# Patient Record
Sex: FEMALE | Race: WHITE
[De-identification: names, ages, dates, MRNs, and addresses within clinical notes are randomized per-mention and may not be internally consistent; named-entity substitution may affect disease eponyms.]

---
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
## 2020-02-14 ENCOUNTER — HOSPITAL ENCOUNTER (EMERGENCY)
Dept: HOSPITAL 41 - JD.ED | Age: 20
Discharge: HOME | End: 2020-02-14
Payer: MEDICAID

## 2020-02-14 VITALS — DIASTOLIC BLOOD PRESSURE: 74 MMHG | SYSTOLIC BLOOD PRESSURE: 112 MMHG | HEART RATE: 78 BPM

## 2020-02-14 DIAGNOSIS — Z3A.21: ICD-10-CM

## 2020-02-14 DIAGNOSIS — D25.9: ICD-10-CM

## 2020-02-14 DIAGNOSIS — O34.12: Primary | ICD-10-CM

## 2020-02-14 DIAGNOSIS — Z88.0: ICD-10-CM

## 2020-02-14 DIAGNOSIS — Z91.011: ICD-10-CM

## 2020-02-14 PROCEDURE — 81003 URINALYSIS AUTO W/O SCOPE: CPT

## 2020-02-14 PROCEDURE — 96360 HYDRATION IV INFUSION INIT: CPT

## 2020-02-14 PROCEDURE — 76815 OB US LIMITED FETUS(S): CPT

## 2020-02-14 PROCEDURE — 85025 COMPLETE CBC W/AUTO DIFF WBC: CPT

## 2020-02-14 PROCEDURE — 99284 EMERGENCY DEPT VISIT MOD MDM: CPT

## 2020-02-14 PROCEDURE — 83690 ASSAY OF LIPASE: CPT

## 2020-02-14 PROCEDURE — 36415 COLL VENOUS BLD VENIPUNCTURE: CPT

## 2020-02-14 PROCEDURE — 80053 COMPREHEN METABOLIC PANEL: CPT

## 2020-02-14 NOTE — EDM.PDOC
ED HPI GENERAL MEDICAL PROBLEM





- General


Chief Complaint: Abdominal Pain


Stated Complaint: ABDOMINAL PAIN 21 WEEKS PREG CLEARED BY OB


Time Seen by Provider: 02/14/20 21:03


Source of Information: Reports: Patient


History Limitations: Reports: No Limitations





- History of Present Illness


INITIAL COMMENTS - FREE TEXT/NARRATIVE: 





The patient presents from OB for LLQ abdominal pain.  This started about 3 

hours ago.  She has no nausea or vomiting.  She has no fever, chills, cough, 

congestion, runny nose, chest pain, shortness of breath, dysuria, hematuria or 

diarrhea.  She is G1 and 21 weeks gestation with a LNMP of 9/16/19.  She went 

to OB and they cleared her.  The baby has a FHT of 146.  She has no vaginal 

discharge or bleeding.  She has had no trouble with bowel movements.


Onset: Gradual


Duration: Hour(s):


Location: Reports: Abdomen


Quality: Reports: Sharp


Severity: Moderate


Improves with: Reports: None


Worsens with: Reports: None


Associated Symptoms: Denies: Chest Pain, Cough, Fever/Chills, Headaches, Nausea/

Vomiting, Shortness of Breath


  ** Left Lower Abdomen


Pain Score (Numeric/FACES): 6





- Related Data


 Allergies











Allergy/AdvReac Type Severity Reaction Status Date / Time


 


Dairy Products Allergy  Abdominal Verified 02/14/20 21:03





   Cramps  


 


Penicillins Allergy  Rash Verified 02/14/20 21:03











Home Meds: 


 Home Meds





Prenatal Vits #93/Iron Fum/FA [Prenatal Formula Tablet] 1 tab PO DAILY 02/14/20 

[History]











Past Medical History


Gastrointestinal History: Reports: GERD, Other (See Below)


Other Gastrointestinal History: Lactose intolerant, unexplained stomach issues 

for years


Psychiatric History: Reports: Anxiety, Depression





- Past Surgical History


HEENT Surgical History: Reports: Tonsillectomy


GI Surgical History: Reports: EGD





Social & Family History





- Family History


Family Medical History: Noncontributory





- Tobacco Use


Smoking Status *Q: Never Smoker





- Caffeine Use


Caffeine Use: Reports: Soda





- Recreational Drug Use


Recreational Drug Use: No





ED ROS GENERAL





- Review of Systems


Review Of Systems: See Below


Constitutional: Reports: No Symptoms


HEENT: Reports: No Symptoms


Respiratory: Reports: No Symptoms


Cardiovascular: Reports: No Symptoms


Endocrine: Reports: No Symptoms


GI/Abdominal: Reports: Abdominal Pain.  Denies: Diarrhea, Nausea, Vomiting


: Reports: No Symptoms


Musculoskeletal: Reports: No Symptoms


Skin: Reports: No Symptoms





ED EXAM, GI/ABD





- Physical Exam


Exam: See Below


Exam Limited By: No Limitations


General Appearance: Alert, No Apparent Distress


Ears: Normal External Exam


Nose: Normal Inspection


Head: Atraumatic, Normocephalic


Neck: Normal Inspection


Respiratory/Chest: No Respiratory Distress, Lungs Clear, Normal Breath Sounds


Cardiovascular: Regular Rate, Rhythm, No Edema, No Murmur


GI/Abdominal Exam: Soft, Tender (Mild tenderness to the left lower abdomen), 

Other (Gravid uterus above the umbilicus)





Course





- Vital Signs


Last Recorded V/S: 


 Last Vital Signs











Temp  97.1 F   02/14/20 21:04


 


Pulse  78   02/14/20 21:04


 


Resp  13   02/14/20 21:04


 


BP  112/74   02/14/20 21:04


 


Pulse Ox  100   02/14/20 21:04














- Orders/Labs/Meds


Orders: 


 Active Orders 24 hr











 Category Date Time Status


 


 Peripheral IV Care [RC] .AS DIRECTED Care  02/14/20 21:22 Active


 


 OB Ltd 1 or More Fetus [US] Stat Exams  02/14/20 21:22 Taken


 


 Sodium Chloride 0.9% [Saline Flush] Med  02/14/20 21:22 Active





 10 ml FLUSH ASDIRECTED PRN   


 


 Peripheral IV Insertion Adult [OM.PC] Stat Oth  02/14/20 21:22 Ordered








 Medication Orders





Sodium Chloride (Saline Flush)  10 ml FLUSH ASDIRECTED PRN


   PRN Reason: Keep Vein Open


   Last Admin: 02/14/20 21:59  Dose: 10 ml








Labs: 


 Laboratory Tests











  02/14/20 02/14/20 02/14/20 Range/Units





  21:56 21:56 21:56 


 


WBC  9.81    (3.98-10.04)  K/mm3


 


RBC  4.23    (3.98-5.22)  M/mm3


 


Hgb  12.5    (11.2-15.7)  gm/dl


 


Hct  37.4    (34.1-44.9)  %


 


MCV  88.4  D    (79.4-94.8)  fl


 


MCH  29.6    (25.6-32.2)  pg


 


MCHC  33.4    (32.2-35.5)  g/dl


 


RDW Std Deviation  37.3    (36.4-46.3)  fL


 


Plt Count  270    (182-369)  K/mm3


 


MPV  10.7    (9.4-12.3)  fl


 


Neut % (Auto)  79.5 H    (34.0-71.1)  %


 


Lymph % (Auto)  14.9 L    (19.3-51.7)  %


 


Mono % (Auto)  4.0 L    (4.7-12.5)  %


 


Eos % (Auto)  1.3    (0.7-5.8)  


 


Baso % (Auto)  0.2    (0.1-1.2)  %


 


Neut # (Auto)  7.80 H    (1.56-6.13)  K/mm3


 


Lymph # (Auto)  1.46    (1.18-3.74)  K/mm3


 


Mono # (Auto)  0.39 H    (0.24-0.36)  K/mm3


 


Eos # (Auto)  0.13    (0.04-0.36)  K/mm3


 


Baso # (Auto)  0.02    (0.01-0.08)  K/mm3


 


Sodium   136   (136-145)  mEq/L


 


Potassium   3.6   (3.5-5.1)  mEq/L


 


Chloride   100   ()  mEq/L


 


Carbon Dioxide   23   (21-32)  mEq/L


 


Anion Gap   16.6 H   (5-15)  


 


BUN   9   (7-18)  mg/dL


 


Creatinine   0.6   (0.55-1.02)  mg/dL


 


Est Cr Clr Drug Dosing   135.70   mL/min


 


Estimated GFR (MDRD)   > 60   (>60)  mL/min


 


BUN/Creatinine Ratio   15.0   (14-18)  


 


Glucose   100   ()  mg/dL


 


Calcium   8.7   (8.5-10.1)  mg/dL


 


Total Bilirubin   0.5   (0.2-1.0)  mg/dL


 


AST   17   (15-37)  U/L


 


ALT   27   (14-59)  U/L


 


Alkaline Phosphatase   68   ()  U/L


 


Total Protein   7.4   (6.4-8.2)  g/dl


 


Albumin   3.6   (3.4-5.0)  g/dl


 


Globulin   3.8   gm/dL


 


Albumin/Globulin Ratio   1.0   (1-2)  


 


Lipase   117   ()  U/L


 


Urine Color    Light yellow  (Yellow)  


 


Urine Appearance    Clear  (Clear)  


 


Urine pH    6.0  (5.0-8.0)  


 


Ur Specific Gravity    1.020  (1.005-1.030)  


 


Urine Protein    Negative  (Negative)  


 


Urine Glucose (UA)    Negative  (Negative)  


 


Urine Ketones    Negative  (Negative)  


 


Urine Occult Blood    Negative  (Negative)  


 


Urine Nitrite    Negative  (Negative)  


 


Urine Bilirubin    Negative  (Negative)  


 


Urine Urobilinogen    0.2  (0.2-1.0)  


 


Ur Leukocyte Esterase    Negative  (Negative)  











Meds: 


Medications











Generic Name Dose Route Start Last Admin





  Trade Name Freq  PRN Reason Stop Dose Admin


 


Sodium Chloride  10 ml  02/14/20 21:22  02/14/20 21:59





  Saline Flush  FLUSH   10 ml





  ASDIRECTED PRN   Administration





  Keep Vein Open   





     





     





     














Discontinued Medications














Generic Name Dose Route Start Last Admin





  Trade Name Freq  PRN Reason Stop Dose Admin


 


Sodium Chloride  1,000 mls @ 1,000 mls/hr  02/14/20 21:22  02/14/20 21:59





  Normal Saline  IV  02/14/20 22:21  1,000 mls/hr





  .BOLUS STA   Administration





     





     





     





     














- Re-Assessments/Exams


Free Text/Narrative Re-Assessment/Exam: 





02/14/20 21:45


I ordered an IV NS 1L bolus, labs, UA and an US.


02/14/20 23:15


Her CBC and CMP look good.  Her UA shows no UTI.  Her US shows a single IUP, 

cephalic presentation, cervical length is 3.2cm and 21 weeks and 6 days.  There 

is a non reversible bulge anterior uterine wall could be contraction or 

leiomyomas.  It measures approximately 3 X 6.7cm.





I called Dr Munroe and we talked about the US findings and we agreed this is 

nothing to worry about and not the cause of her pain.  I will have her go home 

and take some tylenol and follow up with her doctor.





Departure





- Departure


Time of Disposition: 23:25


Disposition: Home, Self-Care 01


Condition: Good


Clinical Impression: 


Pregnancy


Qualifiers:


 Weeks of gestation: 21 weeks Qualified Code(s): Z3A.21 - 21 weeks gestation of 

pregnancy





Uterine fibroid


Qualifiers:


 Uterine leiomyoma location: unspecified location Qualified Code(s): D25.9 - 

Leiomyoma of uterus, unspecified








- Discharge Information


*PRESCRIPTION DRUG MONITORING PROGRAM REVIEWED*: Not Applicable


*COPY OF PRESCRIPTION DRUG MONITORING REPORT IN PATIENT FLORENTIN: Not Applicable


Referrals: 


Agustina Giles MD [Primary Care Provider] - 1 Week


Forms:  ED Department Discharge


Additional Instructions: 


Drink plenty of fluids.  Take tylenol for pain.  Use a stool softener to keep 

your stools loose for a few days.  Follow up with Dr Giles this next week.  

Please return if you are worse.





Sepsis Event Note





- Evaluation


Sepsis Screening Result: No Definite Risk





- Focused Exam


Vital Signs: 


 Vital Signs











  Temp Pulse Resp BP Pulse Ox


 


 02/14/20 21:04  97.1 F  78  13  112/74  100











Date Exam was Performed: 02/14/20


Time Exam was Performed: 23:22





- My Orders


Last 24 Hours: 


My Active Orders





02/14/20 21:22


Peripheral IV Care [RC] .AS DIRECTED 


OB Ltd 1 or More Fetus [US] Stat 


Sodium Chloride 0.9% [Saline Flush]   10 ml FLUSH ASDIRECTED PRN 


Peripheral IV Insertion Adult [OM.PC] Stat 














- Assessment/Plan


Last 24 Hours: 


My Active Orders





02/14/20 21:22


Peripheral IV Care [RC] .AS DIRECTED 


OB Ltd 1 or More Fetus [US] Stat 


Sodium Chloride 0.9% [Saline Flush]   10 ml FLUSH ASDIRECTED PRN 


Peripheral IV Insertion Adult [OM.PC] Stat

## 2020-02-15 NOTE — US
Limited obstetrical ultrasound: Multiple real-time images were 

obtained transabdominally.

 

Comparison: No previous exam for current pregnancy is available.

 

Dates:

Current ultrasound: NANIKA 06/20/20, gestational age 21 weeks 6 days

 

Fetal presentation: Cephalic

Placenta: Posterior.  No definite findings of placenta previa or 

placental abruption.

Other findings: Bulge in the anterior uterus is seen either due to 

persistent uterine contraction or fibroid.  This finding measures 8.1 

x 5.6 x 3.7 cm

Amniotic fluid: MARGARITO 13.8 cm

 

Measurements:

BPD: 5.26 cm - 22 weeks 0 days

Head circumference: 18.76 cm - 21 weeks 0 days

Abdominal circumference: 17.03 cm - 22 weeks 0 days

Femur length: 3.80 cm - 22 weeks 1 day

Estimated fetal weight: 465 g (1 lb. 0 oz.), estimated fetal weight is

 65th percentile

Heart rate: 146 bpm

 

Impression:

1.  Single intrauterine fetus currently cephalic in presentation.  

Dates as noted above.

2.  Focal myometrial contraction versus fibroid within the anterior 

uterus.  Measurements as noted above.

3.  No other complicating process is identified.

 

Diagnostic code #2

 

This report was dictated in [place] Standard Time

 

I agree with preliminary report from [facility], finalized on 02/14/20, 

11:45 PM Central Time

## 2020-06-20 ENCOUNTER — HOSPITAL ENCOUNTER (INPATIENT)
Dept: HOSPITAL 41 - JD.OBCHECK | Age: 20
LOS: 3 days | Discharge: HOME | End: 2020-06-23
Attending: OBSTETRICS & GYNECOLOGY | Admitting: OBSTETRICS & GYNECOLOGY
Payer: MEDICAID

## 2020-06-20 DIAGNOSIS — Z91.011: ICD-10-CM

## 2020-06-20 DIAGNOSIS — Z88.0: ICD-10-CM

## 2020-06-20 DIAGNOSIS — K21.9: ICD-10-CM

## 2020-06-20 DIAGNOSIS — Z3A.39: ICD-10-CM

## 2020-06-20 NOTE — PCM.LDHP
L&D History of Present Illness





- General


Date of Service: 20


Admit Problem/Dx: 


                   Patient Status Order with Admit Dx/Problem





20 20:03


Patient Status [ADT] Routine 





20 21:00


Patient Status [ADT] Routine 








                           Admission Diagnosis/Problem











Admission Diagnosis/Problem    Pregnancy














Source of Information: Patient


History Limitations: Reports: No Limitations





- History of Present Illness


Introduction:: 





19 year old  here with SROM about 5 hour prior to arrival of clear fluid. No

 contractions. Amnisure positive.


PNC with Dr. Giles without significant complications.





- Related Data


Allergies/Adverse Reactions: 


                                    Allergies











Allergy/AdvReac Type Severity Reaction Status Date / Time


 


Dairy Products Allergy  Abdominal Verified 20 21:03





   Cramps  


 


Penicillins Allergy  Rash Verified 20 21:03











Home Medications: 


                                    Home Meds





Prenatal Vits #93/Iron Fum/FA [Prenatal Formula Tablet] 1 tab PO DAILY 20 

[History]











Past Medical History


Gastrointestinal History: Reports: GERD, Other (See Below)


Other Gastrointestinal History: Lactose intolerant, unexplained stomach issues 

for years


Psychiatric History: Reports: Anxiety, Depression





- Past Surgical History


HEENT Surgical History: Reports: Tonsillectomy


GI Surgical History: Reports: EGD





Social & Family History





- Family History


Family Medical History: Noncontributory





- Caffeine Use


Caffeine Use: Reports: Soda





H&P Review of Systems





- Review of Systems:


Review Of Systems: See Below


General: Reports: No Symptoms


HEENT: Reports: No Symptoms


Pulmonary: Reports: No Symptoms


Cardiovascular: Reports: No Symptoms


Gastrointestinal: Reports: No Symptoms


Genitourinary: Reports: No Symptoms


Musculoskeletal: Reports: No Symptoms


Skin: Reports: No Symptoms


Psychiatric: Reports: No Symptoms


Neurological: Reports: No Symptoms


Hematologic/Lymphatic: Reports: No Symptoms


Immunologic: Reports: No Symptoms





L&D Exam





- Exam


Exam: See Below





- Vital Signs


Vital Signs: 


                                Last Vital Signs











Temp  36.8 C   20 20:03


 


Pulse  88   20 20:03


 


Resp  14   20 20:03


 


BP  131/83   20 20:03


 


Pulse Ox  99   20 20:03











Weight: 85.275 kg





- OB Specific


Contraction Intensity: Irritability


Fetal Movement: Active


Fetal Heart Tones: Present


Fetal Heart Rate (FHR) Variability: Moderate (6-25 bmp)


Birth Presentation: Vertex





- Baca Score


Baca Score Cervix Position: Anterior


Baca Score Consistency: Soft


Baca Score Effacement: >80%


Baca Score Dilation: 1-2 cm


Baca Score Infant's Station: -2


Baca Score Total: 9





- Exam


General: Alert, Oriented


HEENT: PERRLA, Conjunctiva Clear, EACs Clear, EOMI, Hearing Intact, Mucosa Moist

 & Pink, Nares Patent, Normal Nasal Septum, Posterior Pharynx Clear, TMs Clear


Neck: Supple, Trachea Midline


Lungs: Clear to Auscultation, Normal Respiratory Effort


Cardiovascular: Regular Rate, Regular Rhythm


GI/Abdominal Exam: Normal Bowel Sounds, Soft, Non-Tender, No Organomegaly, No 

Distention, No Abnormal Bruit, No Mass, Pelvis Stable


Genitourinary: Normal external exam, Normal bimanual exam


Back Exam: Normal Inspection, Full Range of Motion


Extremities: Normal Inspection, Normal Range of Motion, Non-Tender, No Pedal 

Edema, Normal Capillary Refill


Skin: Warm, Dry, Intact


Neurological: Cranial Nerves Intact, Reflexes Equal Bilateral


Psychiatric: Alert, Normal Affect, Normal Mood





- Patient Data


Lab Results Last 24 hrs: 


                         Laboratory Results - last 24 hr











  20 Range/Units





  20:31 21:30 21:30 


 


WBC   8.89   (3.98-10.04)  K/mm3


 


RBC   4.08   (3.98-5.22)  M/mm3


 


Hgb   11.0 L D   (11.2-15.7)  gm/dl


 


Hct   33.9 L   (34.1-44.9)  %


 


MCV   83.1  D   (79.4-94.8)  fl


 


MCH   27.0   (25.6-32.2)  pg


 


MCHC   32.4   (32.2-35.5)  g/dl


 


RDW Std Deviation   37.9   (36.4-46.3)  fL


 


Plt Count   224   (182-369)  K/mm3


 


MPV   11.4   (9.4-12.3)  fl


 


Neut % (Auto)   74.3 H   (34.0-71.1)  %


 


Lymph % (Auto)   17.8 L   (19.3-51.7)  %


 


Mono % (Auto)   6.2   (4.7-12.5)  %


 


Eos % (Auto)   1.3   (0.7-5.8)  


 


Baso % (Auto)   0.3   (0.1-1.2)  %


 


Neut # (Auto)   6.60 H   (1.56-6.13)  K/mm3


 


Lymph # (Auto)   1.58   (1.18-3.74)  K/mm3


 


Mono # (Auto)   0.55 H   (0.24-0.36)  K/mm3


 


Eos # (Auto)   0.12   (0.04-0.36)  K/mm3


 


Baso # (Auto)   0.03   (0.01-0.08)  K/mm3


 


Fetal Membrane Rupture  Positive H    


 


Blood Type    A POSITIVE  











Result Diagrams: 


                                 20 21:30





Problem List Initiated/Reviewed/Updated: Yes


Orders Last 24hrs: 


                               Active Orders 24 hr











 Category Date Time Status


 


 Patient Status [ADT] Routine ADT  20 20:03 Active


 


 Patient Status [ADT] Routine ADT  20 21:00 Active


 


 Activity as Tolerated [RC] PFP Care  20 21:00 Active


 


 Communication Order [RC] ASDIRECTED Care  20 21:00 Active


 


 Fetal Heart Tones [RC] ASDIRECTED Care  20 21:01 Active


 


 Fetal Non Stress Test [RC] PER UNIT ROUTINE Care  20 20:03 Active


 


 Fetal Non Stress Test [RC] PER UNIT ROUTINE Care  20 21:00 Active


 


 Notify Provider [RC] PFP Care  20 21:00 Active


 


 Notify Provider [RC] PRN Care  20 21:00 Active


 


 Peripheral IV Care [RC] .AS DIRECTED Care  20 21:01 Active


 


 Urinary Catheter Assessment [RC] ASDIRECTED Care  20 21:00 Active


 


 Vital Signs [RC] PER UNIT ROUTINE Care  20 20:03 Active


 


 Vital Signs [RC] PER UNIT ROUTINE Care  20 21:00 Active


 


 Regular Diet [DIET] Diet  20 Breakfast Active


 


 PATIENT RETYPE [BBK] Routine Lab  20 21:30 Received


 


 RAPID PLASMA REAGIN,RPR [CHEM] Routine Lab  20 21:30 Received


 


 TYPE AND SCREEN [BBK] Stat Lab  20 21:30 Results


 


 Lactated Ringers [Ringers, Lactated] 1,000 ml Med  20 21:00 Active





 IV ASDIRECTED   


 


 Nalbuphine [Nubain] Med  20 21:00 Active





 10 mg IVPUSH Q2H PRN   


 


 Ondansetron [Zofran] Med  20 21:00 Active





 4 mg IVPUSH Q4H PRN   


 


 Oxytocin/Lactated Ringers [Pitocin in LR 10 Units/1,000 Med  20 21:00 

Active





 ML]   





 10 unit in 1,000 ml IV .CONTINUOUS   


 


 Sodium Chloride 0.9% [Saline Flush] Med  20 21:00 Active





 10 ml FLUSH ASDIRECTED PRN   


 


 Electronic Fetal Heart Tones Ext w TOCO [WOMSER] Ot  20 21:00 Ordered





 Routine   


 


 Electronic Fetal Heart Tones Internal [WOMSER] Per Unit Ot  20 21:00 

Ordered





 Routine   


 


 Peripheral IV Insertion Adult [OM.PC] Routine Ot  20 21:00 Ordered


 


 Resuscitation Status Routine Resus Stat  20 20:03 Ordered








                                Medication Orders





Lactated Ringer's (Ringers, Lactated)  1,000 mls @ 100 mls/hr IV ASDIRECTED SHIMON


Oxytocin/Lactated Ringer's (Pitocin In Lr 10 Units/1,000 Ml)  10 unit in 1,000 

mls @ 500 mls/hr IV .CONTINUOUS SHIMON


Nalbuphine HCl (Nubain)  10 mg IVPUSH Q2H PRN


   PRN Reason: Pain


Ondansetron HCl (Zofran)  4 mg IVPUSH Q4H PRN


   PRN Reason: Nausea/Vomiting


Sodium Chloride (Saline Flush)  10 ml FLUSH ASDIRECTED PRN


   PRN Reason: Keep Vein Open








Assessment/Plan Comment:: 





Term SROM. AROM of forebag. Pitocin if no significant contractions or cervical 

change after 1-2 hours. Patient reluctant but will agree.

## 2020-06-21 PROCEDURE — 3E0R3BZ INTRODUCTION OF ANESTHETIC AGENT INTO SPINAL CANAL, PERCUTANEOUS APPROACH: ICD-10-PCS | Performed by: OBSTETRICS & GYNECOLOGY

## 2020-06-21 PROCEDURE — 0UQMXZZ REPAIR VULVA, EXTERNAL APPROACH: ICD-10-PCS | Performed by: OBSTETRICS & GYNECOLOGY

## 2020-06-21 PROCEDURE — 00HU33Z INSERTION OF INFUSION DEVICE INTO SPINAL CANAL, PERCUTANEOUS APPROACH: ICD-10-PCS | Performed by: OBSTETRICS & GYNECOLOGY

## 2020-06-21 RX ADMIN — WITCH HAZEL PRN CONTAINER: 500 SOLUTION RECTAL; TOPICAL at 13:13

## 2020-06-21 NOTE — PCM.PREANE
Preanesthetic Assessment





- Procedure


Proposed Procedure: 





epidural








- Anesthesia/Transfusion/Family Hx


Anesthesia History: Prior Anesthesia Without Reaction


Family History of Anesthesia Reaction: No


Transfusion History: No Prior Transfusion(s)





- Review of Systems


General: Fatigue, Malaise


Pulmonary: No Symptoms


Cardiovascular: No Symptoms


Gastrointestinal: Abdominal Pain (labor)


Neurological: No Symptoms


Other: Reports: None





- Physical Assessment


Vital Signs: 





                                Last Vital Signs











Temp  36.8 C   06/20/20 20:03


 


Pulse  88   06/20/20 20:03


 


Resp  14   06/20/20 20:03


 


BP  131/83   06/20/20 20:03


 


Pulse Ox  99   06/20/20 20:03











Height: 1.65 m


Weight: 85.275 kg


ASA Class: 2


Mental Status: Alert & Oriented x3


Airway Class: Mallampati = 1


Dentition: Reports: Normal Dentition


Thyro-Mental Finger Breadths: 3


Mouth Opening Finger Breadths: 3


ROM/Head Extension: Full


Lungs: Clear to Auscultation, Normal Respiratory Effort


Cardiovascular: Regular Rate, Regular Rhythm





- Lab


Values: 





                             Laboratory Last Values











WBC  8.89 K/mm3 (3.98-10.04)   06/20/20  21:30    


 


RBC  4.08 M/mm3 (3.98-5.22)   06/20/20  21:30    


 


Hgb  11.0 gm/dl (11.2-15.7)  L D 06/20/20  21:30    


 


Hct  33.9 % (34.1-44.9)  L  06/20/20  21:30    


 


MCV  83.1 fl (79.4-94.8)  D 06/20/20  21:30    


 


MCH  27.0 pg (25.6-32.2)   06/20/20  21:30    


 


MCHC  32.4 g/dl (32.2-35.5)   06/20/20  21:30    


 


RDW Std Deviation  37.9 fL (36.4-46.3)   06/20/20  21:30    


 


Plt Count  224 K/mm3 (182-369)   06/20/20  21:30    


 


MPV  11.4 fl (9.4-12.3)   06/20/20  21:30    


 


Neut % (Auto)  74.3 % (34.0-71.1)  H  06/20/20  21:30    


 


Lymph % (Auto)  17.8 % (19.3-51.7)  L  06/20/20  21:30    


 


Mono % (Auto)  6.2 % (4.7-12.5)   06/20/20  21:30    


 


Eos % (Auto)  1.3  (0.7-5.8)   06/20/20  21:30    


 


Baso % (Auto)  0.3 % (0.1-1.2)   06/20/20  21:30    


 


Neut # (Auto)  6.60 K/mm3 (1.56-6.13)  H  06/20/20  21:30    


 


Lymph # (Auto)  1.58 K/mm3 (1.18-3.74)   06/20/20  21:30    


 


Mono # (Auto)  0.55 K/mm3 (0.24-0.36)  H  06/20/20  21:30    


 


Eos # (Auto)  0.12 K/mm3 (0.04-0.36)   06/20/20  21:30    


 


Baso # (Auto)  0.03 K/mm3 (0.01-0.08)   06/20/20  21:30    


 


Fetal Membrane Rupture  Positive  H  06/20/20  20:31    


 


Blood Type  A POSITIVE   06/20/20  21:30    


 


Gel Antibody Screen  Negative   06/20/20  21:30    














- Allergies


Allergies/Adverse Reactions: 


                                    Allergies











Allergy/AdvReac Type Severity Reaction Status Date / Time


 


Dairy Products Allergy  Abdominal Verified 02/14/20 21:03





   Cramps  


 


Penicillins Allergy  Rash Verified 02/14/20 21:03














- Anesthesia Plan


Pre-Op Medication Ordered: None





- Acknowledgements


Anesthesia Type Planned: Epidural


Pt an Appropriate Candidate for the Planned Anesthesia: Yes


Alternatives and Risks of Anesthesia Discussed w Pt/Guardian: Yes


Pt/Guardian Understands and Agrees with Anesthesia Plan: Yes





PreAnesthesia Questionnaire


HEENT History: Reports: None


Cardiovascular History: Reports: None


Respiratory History: Reports: None


Gastrointestinal History: Reports: GERD, Other (See Below)


Other Gastrointestinal History: Lactose intolerant, unexplained stomach issues 

for years


Genitourinary History: Reports: None


OB/GYN History: Reports: Pregnancy


Musculoskeletal History: Reports: None


Neurological History: Reports: None


Psychiatric History: Reports: Anxiety, Depression


Other Psychiatric History: on zoloft and lexapro at various times between ages 

15-17.  Presently denies any issues.


Endocrine/Metabolic History: Reports: None


Hematologic History: Reports: None


Immunologic History: Reports: None


Oncologic (Cancer) History: Reports: None


Dermatologic History: Reports: None





- Infectious Disease History


Infectious Disease History: Reports: None





- Past Surgical History


HEENT Surgical History: Reports: Tonsillectomy


GI Surgical History: Reports: EGD





- SUBSTANCE USE


Smoking Status *Q: Current Every Day Smoker


Tobacco Use Within Last Twelve Months: Cigarettes, Vaping


Second Hand Smoke Exposure: Yes


Recreational Drug Use History: Yes


Recreational Drug Type: Reports: Methamphetamine





- HOME MEDS


Home Medications: 


                                    Home Meds





Prenatal Vits #93/Iron Fum/FA [Prenatal Formula Tablet] 1 tab PO DAILY 02/14/20 

[History]











- CURRENT (IN HOUSE) MEDS


Current Meds: 





                               Current Medications





Diphenhydramine HCl (Benadryl)  25 mg IVPUSH Q6H PRN


   PRN Reason: pruritis


Ephedrine Sulfate (Ephedrine Sulfate)  5 mg IVPUSH ASDIRECTED PRN


   PRN Reason: Hypotension


Fentanyl (Sublimaze)  100 mcg EPIDUR Q3H PRN


   PRN Reason: Pain


Fentanyl/Bupivacaine HCl (Fentanyl/Bupivacaine/Ns 2 Mcg-0.125% 100 Ml)  100 ml 

EPIDUR ASDIRECTED PRN


   PRN Reason: Pain


Lactated Ringer's (Ringers, Lactated)  1,000 mls @ 100 mls/hr IV ASDIRECTED SHIMON


   Last Admin: 06/21/20 01:05 Dose:  999 mls/hr


   Documented by: 


Oxytocin/Lactated Ringer's (Pitocin In Lr 10 Units/1,000 Ml)  10 unit in 1,000 

mls @ 500 mls/hr IV .CONTINUOUS SHIMON


Nalbuphine HCl (Nubain)  10 mg IVPUSH Q2H PRN


   PRN Reason: Pain


Ondansetron HCl (Zofran)  4 mg IVPUSH Q4H PRN


   PRN Reason: Nausea/Vomiting


Sodium Chloride (Saline Flush)  10 ml FLUSH ASDIRECTED PRN


   PRN Reason: Keep Vein Open

## 2020-06-21 NOTE — [id number]
Post Anesthesia Note





- EVALUATION WITHIN 48HRS OF ANESTHETIC


Vital Signs in Normal Range: Yes


Patient Participated in Evaluation: Yes


Respiratory Function Stable: Yes


Airway Patent: Yes


Cardiovascular Function Stable: Yes


Hydration Status Stable: Yes


Pain Control Satisfactory: Yes


Nausea and Vomiting Control Satisfactory: Yes


Mental Status Recovered: Yes


Vital Signs: 


                                Last Vital Signs











Temp  36.8 C   06/20/20 20:03


 


Pulse  88   06/20/20 20:03


 


Resp  14   06/20/20 20:03


 


BP  131/83   06/20/20 20:03


 


Pulse Ox  99   06/20/20 20:03














- COMMENTS/OBSERVATIONS


Free Text/Narrative:: 





no anesthesia contractions noted

## 2020-06-21 NOTE — PCM.SN.2
- Free Text/Narrative


Note: 





Stage I - Patient presented after SROM at 4pm day of admission. Progressed to 

complete with overall reassuring fetal heart tones. Epidural anesthesia. 





Stage II - After 3 hours of pushing with good effort and some descent of head 

which was ROP patient consented to vacuum. Bladder emptied. Over 4 contractions 

with vacuum and good maternal effort head brought from +3 to . 

Episiotomy cut. Body and shoulders delivered atraumatically. To maternal 

abdomen. Cord clamped and cut. To warmer. 9/9 APGARS, weight 6#14 at 1058. Cord 

blood collected.





Stage III - of intact placenta. 3vc. . MEthergine given. 2nd degree 

episiotomy and left sided labial laceration repaired with 3-0 vicryl.

## 2020-06-22 RX ADMIN — WITCH HAZEL PRN CONTAINER: 500 SOLUTION RECTAL; TOPICAL at 21:56

## 2020-06-22 NOTE — PCM.PNPP
- General Info


Date of Service: 20


Functional Status: Reports: Pain Controlled





- Review of Systems


General: Reports: No Symptoms


HEENT: Reports: No Symptoms


Pulmonary: Reports: No Symptoms


Cardiovascular: Reports: No Symptoms


Gastrointestinal: Reports: No Symptoms


Genitourinary: Reports: No Symptoms


Musculoskeletal: Reports: No Symptoms


Skin: Reports: No Symptoms


Neurological: Reports: No Symptoms


Psychiatric: Reports: No Symptoms





- General Info


Date of Service: 20





- Patient Data


Vital Signs - Most Recent: 


                                Last Vital Signs











Temp  36.7 C   20 03:51


 


Pulse  77   20 03:51


 


Resp  16   20 03:51


 


BP  102/51 L  20 03:51


 


Pulse Ox  100   20 03:51











Weight - Most Recent: 85.275 kg


I&O - Last 24 Hours: 


                                 Intake & Output











 20





 14:59 22:59 06:59


 


Intake Total 1300  


 


Output Total 1100  


 


Balance 200  











Med Orders - Current: 


                               Current Medications





Benzocaine/Menthol (Dermoplast Pain Relief Spray)  0 gm TOP ASDIRECTED PRN


   PRN Reason: Perineal Comfort Measure


   Last Admin: 20 13:13 Dose:  1 can


   Documented by: 


Docusate Sodium (Colace)  100 mg PO BID PRN


   PRN Reason: Constipation


   Last Admin: 20 21:14 Dose:  100 mg


   Documented by: 


Hydrocortisone Acetate (Anucort-Hc)  25 mg RECTAL BID PRN


   PRN Reason: Hemorrhoid pain


Ibuprofen (Motrin)  600 mg PO Q6H PRN


   PRN Reason: Mild pain or fever


   Last Admin: 20 03:50 Dose:  600 mg


   Documented by: 


Methylergonovine Maleate (Methergine)  0.2 mg IM Q4H PRN


   PRN Reason: Pain


   Last Admin: 20 11:06 Dose:  0.2 mg


   Documented by: 


Witch Hazel (Tucks)  1 pad TOP ASDIRECTED PRN


   PRN Reason: Pain


   Last Admin: 20 13:13 Dose:  1 container


   Documented by: 





Discontinued Medications





Diphenhydramine HCl (Benadryl)  25 mg IVPUSH Q6H PRN


   PRN Reason: pruritis


Ephedrine Sulfate (Ephedrine Sulfate)  5 mg IVPUSH ASDIRECTED PRN


   PRN Reason: Hypotension


Fentanyl (Sublimaze)  100 mcg EPIDUR Q3H PRN


   PRN Reason: Pain


   Last Admin: 20 02:56 Dose:  100 mcg


   Documented by: 


Fentanyl/Bupivacaine HCl (Fentanyl/Bupivacaine/Ns 2 Mcg-0.125% 100 Ml)  100 ml 

EPIDUR ASDIRECTED PRN


   PRN Reason: Pain


   Last Admin: 20 02:54 Dose:  100 ml


   Documented by: 


Lactated Ringer's (Ringers, Lactated)  1,000 mls @ 100 mls/hr IV ASDIRECTED SHIMON


   Last Admin: 20 03:15 Dose:  100 mls/hr


   Documented by: 


Oxytocin/Lactated Ringer's (Pitocin In Lr 10 Units/1,000 Ml)  10 unit in 1,000 

mls @ 500 mls/hr IV .CONTINUOUS SHIMON


   Last Admin: 20 10:58 Dose:  500 mls/hr


   Documented by: 


Methylergonovine Maleate (Methergine) Confirm Administered Dose 0.2 mg .ROUTE 

.[facility]


   Stop: 20 11:05


   Last Admin: 20 15:40 Dose:  Not Given


   Documented by: 


Nalbuphine HCl (Nubain)  10 mg IVPUSH Q2H PRN


   PRN Reason: Pain


Ondansetron HCl (Zofran)  4 mg IVPUSH Q4H PRN


   PRN Reason: Nausea/Vomiting


Sodium Chloride (Saline Flush)  10 ml FLUSH ASDIRECTED PRN


   PRN Reason: Keep Vein Open











- Infant Interaction


Infant Disposition, Postpartum:  at Bedside


Support Person: Significant Other





- Postpartum Recovery Exam


Fundal Tone: Firm


Fundal Level: 1 Fingerbreadths Below Umbilicus


Fundal Placement: Midline


Lochia Amount: Small


Lochia Color: Rubra/Red


Perineum Description: Edematous, Other (see below)


Other Perinuem Description: Epis and 2nd degree laceration with repair


Episiotomy/Laceration: Approximated


Bladder Status: Voiding


Urinary Elimination: Voided





- Exam


General: Alert, Oriented


HEENT: Pupils Equal


Neck: Supple


Lungs: Clear to Auscultation, Normal Respiratory Effort


Cardiovascular: Regular Rate, Regular Rhythm


GI/Abdominal Exam: Normal Bowel Sounds, Soft, Non-Tender, No Organomegaly, No 

Distention, No Abnormal Bruit, No Mass, Pelvis Stable


Extremities: Normal Inspection, Normal Range of Motion, Non-Tender, No Pedal 

Edema, Normal Capillary Refill


Neurological: No New Focal Deficit


Psy/Mental Status: Alert, Normal Affect, Normal Mood





- Problem List Review


Problem List Initiated/Reviewed/Updated: Yes





- My Orders


Last 24 Hours: 


My Active Orders





20 12:24


Benzocaine/Menthol [Dermoplast Pain Relief Spray]   See Dose Instructions  TOP 

ASDIRECTED PRN 


Docusate Sodium [Colace]   100 mg PO BID PRN 


Hydrocortisone Acetate [Anucort-HC]   25 mg RECTAL BID PRN 


Ibuprofen [Motrin]   600 mg PO Q6H PRN 


Methylergonovine [Methergine]   0.2 mg IM Q4H PRN 


witch hazeL [Tucks]   1 pad TOP ASDIRECTED PRN 


Heat Therapy [OM.PC] PRN 





20 12:24


Activity as Tolerated [RC] PER UNIT ROUTINE 


Vital Signs [RC] 03,09,15,21 


Assess Lochia [WOMSER] Per Unit Routine 


Assess Uterine Involution [WOMSER] Per Unit Routine 


Breast Pump [WOMSER] Per Unit Routine 


Medication Administration Instruction [OM.PC] Routine 


Perineal Care [OM.PC] Per Unit Routine 


Sitz Bath [OM.PC] Per Unit Routine 





20 Dinner


Regular Diet [DIET] 





20 12:24


Heat Therapy [OM.PC] PRN 














- Assessment


Assessment:: 





19 year old  s/p VAVD (OP) infant.


Doing well.


No significant complaints.

## 2020-06-23 VITALS — DIASTOLIC BLOOD PRESSURE: 76 MMHG | SYSTOLIC BLOOD PRESSURE: 122 MMHG | HEART RATE: 77 BPM

## 2020-06-23 NOTE — PCM.DCSUM1
**Discharge Summary





- Hospital Course


Diagnosis: Stroke: No





- Discharge Data


Discharge Date: 20


Discharge Disposition: Home, Self-Care 01


Condition: Good





- Referral to Home Health


Primary Care Physician: 


Agustina Giles MD








- Patient Summary/Data


Hospital Course: 





Stage I - Patient presented after SROM at 4pm day of admission. Progressed to co

mplete with overall reassuring fetal heart tones. Epidural anesthesia. 





Stage II - After 3 hours of pushing with good effort and some descent of head 

which was ROP patient consented to vacuum. Bladder emptied. Over 4 contractions 

with vacuum and good maternal effort head brought from +3 to . 

Episiotomy cut. Body and shoulders delivered atraumatically. To maternal 

abdomen. Cord clamped and cut. To warmer. 9/9 APGARS, weight 6#14 at 1058. Cord 

blood collected.





Stage III - of intact placenta. 3vc. . MEthergine given. 2nd degree 

episiotomy and left sided labial laceration repaired with 3-0 vicryl.








- Patient Instructions


Diet: Usual Diet as Tolerated


Activity: No Strenuous Activities


Driving: May Drive Today


Showering/Bathing: May Shower


Wound/Incision Care: Keep Operative Site/Wound Site Clean and Dry


Notify Provider of: Fever, Increased Pain, Swelling and Redness, Drainage, 

Nausea and/or Vomiting





- Discharge Plan


*COPY OF PRESCRIPTION DRUG MONITORING REPORT IN PATIENT FLORENTIN: No


Home Medications: 


                                    Home Meds





Prenatal Vits #93/Iron Fum/FA [Prenatal Formula Tablet] 1 tab PO DAILY 20 

[History]








Patient Handouts:  Electronic Cigarette Information, Steps to Quit Smoking


Referrals: 


Agustina Giles MD [Primary Care Provider] -  (2 weeks)





- Discharge Summary/Plan Comment


DC Time >30 min.: No





- General Info


Date of Service: 20


Functional Status: Reports: Pain Controlled





- Review of Systems


General: Reports: No Symptoms


HEENT: Reports: No Symptoms


Pulmonary: Reports: No Symptoms


Cardiovascular: Reports: No Symptoms


Gastrointestinal: Reports: No Symptoms


Genitourinary: Reports: No Symptoms


Musculoskeletal: Reports: No Symptoms


Skin: Reports: No Symptoms


Neurological: Reports: No Symptoms


Psychiatric: Reports: No Symptoms





- Patient Data


Vitals - Most Recent: 


                                Last Vital Signs











Temp  36.6 C   20 03:57


 


Pulse  68   20 03:57


 


Resp  14   20 03:57


 


BP  116/68   20 03:57


 


Pulse Ox  99   20 03:57











Weight - Most Recent: 85.275 kg


I&O - Last 24 hours: 


                                 Intake & Output











 20





 22:59 06:59 14:59


 


Intake Total 0  


 


Balance 0  











Lab Results - Last 24 hrs: 


                         Laboratory Results - last 24 hr











  20 Range/Units





  21:30 


 


RPR  Non-reactive  (NONREACTIVE)  











Med Orders - Current: 


                               Current Medications





Benzocaine/Menthol (Dermoplast Pain Relief Spray)  0 gm TOP ASDIRECTED PRN


   PRN Reason: Perineal Comfort Measure


   Last Admin: 20 13:13 Dose:  1 can


   Documented by: 


Docusate Sodium (Colace)  100 mg PO BID PRN


   PRN Reason: Constipation


   Last Admin: 20 21:14 Dose:  100 mg


   Documented by: 


Hydrocortisone Acetate (Anucort-Hc)  25 mg RECTAL BID PRN


   PRN Reason: Hemorrhoid pain


Ibuprofen (Motrin)  600 mg PO Q6H PRN


   PRN Reason: Mild pain or fever


   Last Admin: 20 21:43 Dose:  600 mg


   Documented by: 


Methylergonovine Maleate (Methergine)  0.2 mg IM Q4H PRN


   PRN Reason: Pain


   Last Admin: 20 11:06 Dose:  0.2 mg


   Documented by: 


Witch Hazel (Tucks)  1 pad TOP ASDIRECTED PRN


   PRN Reason: Pain


   Last Admin: 20 21:56 Dose:  1 container


   Documented by: 





Discontinued Medications





Bupivacaine HCl (Sensorcaine-Mpf 0.25%)  10 ml .ROUTE .STK-MED ONE


   Stop: 20 00:01


Diphenhydramine HCl (Benadryl)  25 mg IVPUSH Q6H PRN


   PRN Reason: pruritis


Ephedrine Sulfate (Ephedrine Sulfate)  5 mg IVPUSH ASDIRECTED PRN


   PRN Reason: Hypotension


Ephedrine Sulfate (Ephedrine 25 Mg/5 Ml Syringe)  25 mg IV .STK-MED ONE


   Stop: 20 00:01


Fentanyl (Sublimaze)  100 mcg EPIDUR Q3H PRN


   PRN Reason: Pain


   Last Admin: 20 02:56 Dose:  100 mcg


   Documented by: 


Fentanyl/Bupivacaine HCl (Fentanyl/Bupivacaine/Ns 2 Mcg-0.125% 100 Ml)  100 ml 

EPIDUR ASDIRECTED PRN


   PRN Reason: Pain


   Last Admin: 20 02:54 Dose:  100 ml


   Documented by: 


Lactated Ringer's (Ringers, Lactated)  1,000 mls @ 100 mls/hr IV ASDIRECTED SIHMON


   Last Admin: 20 03:15 Dose:  100 mls/hr


   Documented by: 


Oxytocin/Lactated Ringer's (Pitocin In Lr 10 Units/1,000 Ml)  10 unit in 1,000 

mls @ 500 mls/hr IV .CONTINUOUS SHIMON


   Last Admin: 20 10:58 Dose:  500 mls/hr


   Documented by: 


Methylergonovine Maleate (Methergine) Confirm Administered Dose 0.2 mg .ROUTE 

.K-MED ONE


   Stop: 20 11:05


   Last Admin: 20 15:40 Dose:  Not Given


   Documented by: 


Nalbuphine HCl (Nubain)  10 mg IVPUSH Q2H PRN


   PRN Reason: Pain


Ondansetron HCl (Zofran)  4 mg IVPUSH Q4H PRN


   PRN Reason: Nausea/Vomiting


Sodium Chloride (Saline Flush)  10 ml FLUSH ASDIRECTED PRN


   PRN Reason: Keep Vein Open











- Exam


General: Reports: Alert, Oriented


HEENT: Reports: Pupils Equal, Pupils Reactive, EOMI, Mucous Membr. Moist/Pink


Neck: Reports: Supple


Lungs: Reports: Clear to Auscultation, Normal Respiratory Effort


Cardiovascular: Reports: Regular Rate, Regular Rhythm


GI/Abdominal Exam: Normal Bowel Sounds, Soft, Non-Tender, No Organomegaly, No 

Distention, No Abnormal Bruit, No Mass, Pelvis Stable


Rectal (Female) Exam: Normal Exam, Normal Rectal Tone


Back Exam: Reports: Normal Inspection, Full Range of Motion


Extremities: Normal Inspection, Normal Range of Motion, Non-Tender, No Pedal 

Edema, Normal Capillary Refill


Skin: Reports: Warm, Dry, Intact


Wound/Incisions: Reports: Healing Well


Neurological: Reports: No New Focal Deficit


Psy/Mental Status: Reports: Alert, Normal Affect, Normal Mood

## 2021-02-22 ENCOUNTER — HOSPITAL ENCOUNTER (EMERGENCY)
Dept: HOSPITAL 41 - JD.ED | Age: 21
Discharge: HOME | End: 2021-02-22
Payer: COMMERCIAL

## 2021-02-22 VITALS — HEART RATE: 102 BPM | SYSTOLIC BLOOD PRESSURE: 139 MMHG | DIASTOLIC BLOOD PRESSURE: 84 MMHG

## 2021-02-22 DIAGNOSIS — Z88.0: ICD-10-CM
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
## 2021-02-22 DIAGNOSIS — Z91.011: ICD-10-CM

## 2021-02-22 DIAGNOSIS — S62.634B: Primary | ICD-10-CM
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
## 2021-02-22 DIAGNOSIS — Z72.0: ICD-10-CM

## 2021-02-22 PROCEDURE — 12002 RPR S/N/AX/GEN/TRNK2.6-7.5CM: CPT

## 2021-02-22 PROCEDURE — 73140 X-RAY EXAM OF FINGER(S): CPT

## 2021-02-22 PROCEDURE — 99283 EMERGENCY DEPT VISIT LOW MDM: CPT

## 2021-02-22 NOTE — CR
Right fourth finger: 4 views of the right fourth finger were obtained.

 

Comparison: No previous finger or hand exam is available.

 

Displaced tuft fracture is seen within the distal phalanx of the 

fourth finger.  Soft tissue swelling is also noted.  No proximal bony 

abnormality is appreciated.

 

Impression:

1.  Displaced tuft fracture within the distal fourth finger.

2.  Soft tissue swelling.

 

Diagnostic code #3

## 2021-02-22 NOTE — EDM.PDOC
ED HPI GENERAL MEDICAL PROBLEM





- General


Chief Complaint: Upper Extremity Injury/Pain


Stated Complaint: RT HAND RING FINGER


Time Seen by Provider: 02/22/21 14:36


Source of Information: Reports: Patient


History Limitations: Reports: No Limitations





- History of Present Illness


INITIAL COMMENTS - FREE TEXT/NARRATIVE: 





20-year-old female presents to the emergency department with complaints of right

ring finger injury.  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 

dropped a washing machine on her right ring finger.


  ** Right Finger-Ring


Pain Score (Numeric/FACES): 9





- Related Data


                                    Allergies











Allergy/AdvReac Type Severity Reaction Status Date / Time


 


Dairy Products Allergy  Abdominal Verified 02/22/21 14:36





   Cramps  


 


Penicillins Allergy  Rash Verified 02/22/21 14:36











Home Meds: 


                                    Home Meds





Amoxicillin/Potassium Clav [Augmentin 500-125 Tablet] 1 each PO BID #28 tablet 

02/22/21 [Rx]











Past Medical History


HEENT History: Reports: None


Cardiovascular History: Reports: None


Respiratory History: Reports: None


Gastrointestinal History: Reports: GERD, Other (See Below)


Other Gastrointestinal History: Lactose intolerant, unexplained stomach issues 

for years


Genitourinary History: Reports: None


OB/GYN History: Reports: Pregnancy


Musculoskeletal History: Reports: None


Neurological History: Reports: None


Psychiatric History: Reports: Anxiety, Depression


Other Psychiatric History: on zoloft and lexapro at various times between ages 

[age].  Presently denies any issues.


Endocrine/Metabolic History: Reports: None


Hematologic History: Reports: None


Immunologic History: Reports: None


Oncologic (Cancer) History: Reports: None


Dermatologic History: Reports: None





- Infectious Disease History


Infectious Disease History: Reports: None





- Past Surgical History


Head Surgeries/Procedures: Reports: None


HEENT Surgical History: Reports: Tonsillectomy


Cardiovascular Surgical History: Reports: None


Respiratory Surgical History: Reports: None


GI Surgical History: Reports: EGD


Female  Surgical History: Reports: None


Endocrine Surgical History: Reports: None


Neurological Surgical History: Reports: None


Musculoskeletal Surgical History: Reports: None


Oncologic Surgical History: Reports: None





Social & Family History





- Family History


Family Medical History: No Pertinent Family History





- Tobacco Use


Tobacco Use Status *Q: Current Every Day Tobacco User


Years of Tobacco use: 3


Packs/Tins Daily: 0.2





- Caffeine Use


Caffeine Use: Reports: Soda





- Recreational Drug Use


Recreational Drug Use: No





Review of Systems





- Review of Systems


Review Of Systems: Comprehensive ROS is negative, except as noted in HPI.





ED EXAM, GENERAL





- Physical Exam


Exam: See Below


Exam Limited By: No Limitations


General Appearance: Alert, WD/WN, Mild Distress


Eye Exam: Bilateral Eye: PERRL


Ears: Hearing Grossly Normal


Nose: Normal Inspection


Throat/Mouth: Normal Inspection, Normal Voice, No Airway Compromise


Head: Atraumatic, Normocephalic


Neck: Normal Inspection, Supple, Non-Tender, Full Range of Motion


Respiratory/Chest: No Respiratory Distress, Lungs Clear, Normal Breath Sounds, 

No Accessory Muscle Use, Chest Non-Tender


Cardiovascular: Normal Peripheral Pulses, Regular Rate, Rhythm, No Edema, No 

Murmur


Peripheral Pulses: 2+: Radial (L), Radial (R)


GI/Abdominal: Normal Bowel Sounds, Soft, Non-Tender, No Distention


 (Female) Exam: Deferred


Rectal (Female) Exam: Deferred


Back Exam: Normal Inspection, Full Range of Motion


Extremities: Normal Inspection, Normal Range of Motion, Non-Tender, No Pedal 

Edema, Normal Capillary Refill


Neurological: Alert, Oriented, Normal Cognition


Psychiatric: Normal Affect, Normal Mood


Skin Exam: Warm, Dry, Normal Color, No Rash, Wound/Incision





ED TRAUMA EXTREMITY PROCEDURES





- Laceration/Wound Repair


  ** Right Distal Digit - 4th (Ring)


Appearance: Subcutaneous, Irregular


Anesthetic Type: Digital


Local Anesthesia - Lidocaine (Xylocaine): Other (Bupivicaine 0.5%)


Local Anesthesia - Bupivicaine (Marcaine): 0.5% Plain


Local Anesthetic Volume: 5cc


Skin Prep: Saline


Exploration/Debridement/Repair: Multiple Flaps Aligned


Closed With: Sutures


Suture Size: 4-0


# of Sutures: 13


Suture Type: Nylon





Course





- Vital Signs


Text/Narrative:: 





20-year-old female with complaints of injury to right ring finger after she was 

attempting to move a washing machine at her work and dropped a washing machine 

on the finger.  Patient sustained an open fracture to the right ring finger at 

the tip.


Last Recorded V/S: 


                                Last Vital Signs











Temp  98.7 F   02/22/21 14:34


 


Pulse  102 H  02/22/21 14:34


 


Resp  16   02/22/21 14:34


 


BP  139/84   02/22/21 14:34


 


Pulse Ox  96   02/22/21 14:34














- Orders/Labs/Meds


Meds: 


Medications














Discontinued Medications














Generic Name Dose Route Start Last Admin





  Trade Name Jose Luis  PRN Reason Stop Dose Admin


 


Bupivacaine HCl  10 ml  02/22/21 15:32  02/22/21 15:44





  Sensorcaine-Mpf 0.5%  INJECT  02/22/21 15:33  10 ml





  ONETIME ONE   Administration














Departure





- Departure


Time of Disposition: 16:44


Disposition: Home, Self-Care 01


Condition: Fair


Clinical Impression: 


Open fracture of distal phalanx of ring finger


Qualifiers:


 Encounter type: initial encounter Fracture alignment: displaced Laterality: 

right Qualified Code(s): S62.634B - Displaced fracture of distal phalanx of 

right ring finger, initial encounter for open fracture








- Discharge Information


Prescriptions: 


Amoxicillin/Potassium Clav [Augmentin 500-125 Tablet] 1 each PO BID #28 tablet


Referrals: 


PCP,None [Primary Care Provider] - 


Forms:  ED Department Discharge


Additional Instructions: 


You were seen in the ED with complaints of crush injury to right ring finger 

with laceration and open fracture.  Xrays confirmed that the finger is broken.  

I spoke with Dr. Ramachandran at [facility] and he looked at your 

xrays.  States that he will place a pin in that finger on Friday.  They will be 

calling you to schedule this appointment.  Sutures were placed in the finger.  

You will need to take antibiotics twice daily for 14 days.  May take a probiotic

with this or eat yogurt.  Keep the finger clean and dry.  May wash with mild 

soapy water and pat dry.  





Sepsis Event Note (ED)





- Evaluation


Sepsis Screening Result: No Definite Risk





- Focused Exam


Vital Signs: 


                                   Vital Signs











  Temp Pulse Resp BP Pulse Ox


 


 02/22/21 14:34  98.7 F  102 H  16  139/84  96

## 2022-11-16 ENCOUNTER — HOSPITAL ENCOUNTER (INPATIENT)
Dept: HOSPITAL 41 - JD.OBCHECK | Age: 22
LOS: 2 days | Discharge: HOME | End: 2022-11-18
Attending: OBSTETRICS & GYNECOLOGY | Admitting: OBSTETRICS & GYNECOLOGY
Payer: MEDICAID

## 2022-11-16 DIAGNOSIS — Z3A.38: ICD-10-CM

## 2022-11-16 DIAGNOSIS — Z91.011: ICD-10-CM

## 2022-11-16 DIAGNOSIS — Z87.891: ICD-10-CM

## 2022-11-16 DIAGNOSIS — Z88.0: ICD-10-CM

## 2022-11-17 PROCEDURE — 3E0R3BZ INTRODUCTION OF ANESTHETIC AGENT INTO SPINAL CANAL, PERCUTANEOUS APPROACH: ICD-10-PCS | Performed by: OBSTETRICS & GYNECOLOGY

## 2022-11-17 PROCEDURE — 10907ZC DRAINAGE OF AMNIOTIC FLUID, THERAPEUTIC FROM PRODUCTS OF CONCEPTION, VIA NATURAL OR ARTIFICIAL OPENING: ICD-10-PCS | Performed by: OBSTETRICS & GYNECOLOGY

## 2022-11-18 VITALS — SYSTOLIC BLOOD PRESSURE: 112 MMHG | DIASTOLIC BLOOD PRESSURE: 71 MMHG | HEART RATE: 64 BPM
